# Patient Record
Sex: FEMALE | Race: WHITE | NOT HISPANIC OR LATINO | Employment: FULL TIME | URBAN - NONMETROPOLITAN AREA
[De-identification: names, ages, dates, MRNs, and addresses within clinical notes are randomized per-mention and may not be internally consistent; named-entity substitution may affect disease eponyms.]

---

## 2024-03-12 ENCOUNTER — HOSPITAL ENCOUNTER (EMERGENCY)
Facility: HOSPITAL | Age: 44
Discharge: HOME | End: 2024-03-13
Attending: STUDENT IN AN ORGANIZED HEALTH CARE EDUCATION/TRAINING PROGRAM

## 2024-03-12 DIAGNOSIS — M25.562 ACUTE PAIN OF LEFT KNEE: Primary | ICD-10-CM

## 2024-03-12 PROCEDURE — 99283 EMERGENCY DEPT VISIT LOW MDM: CPT

## 2024-03-12 PROCEDURE — 96372 THER/PROPH/DIAG INJ SC/IM: CPT

## 2024-03-12 ASSESSMENT — PAIN DESCRIPTION - LOCATION: LOCATION: KNEE

## 2024-03-12 ASSESSMENT — PAIN SCALES - GENERAL: PAINLEVEL_OUTOF10: 10 - WORST POSSIBLE PAIN

## 2024-03-12 ASSESSMENT — PAIN DESCRIPTION - FREQUENCY: FREQUENCY: CONSTANT/CONTINUOUS

## 2024-03-12 ASSESSMENT — PAIN DESCRIPTION - DESCRIPTORS: DESCRIPTORS: ACHING

## 2024-03-12 ASSESSMENT — PAIN DESCRIPTION - ORIENTATION: ORIENTATION: RIGHT;LEFT

## 2024-03-12 ASSESSMENT — PAIN - FUNCTIONAL ASSESSMENT: PAIN_FUNCTIONAL_ASSESSMENT: 0-10

## 2024-03-13 VITALS
TEMPERATURE: 97.7 F | HEART RATE: 100 BPM | HEIGHT: 62 IN | OXYGEN SATURATION: 98 % | WEIGHT: 135 LBS | RESPIRATION RATE: 15 BRPM | DIASTOLIC BLOOD PRESSURE: 91 MMHG | SYSTOLIC BLOOD PRESSURE: 126 MMHG | BODY MASS INDEX: 24.84 KG/M2

## 2024-03-13 PROCEDURE — 2500000004 HC RX 250 GENERAL PHARMACY W/ HCPCS (ALT 636 FOR OP/ED)

## 2024-03-13 RX ORDER — KETOROLAC TROMETHAMINE 15 MG/ML
15 INJECTION, SOLUTION INTRAMUSCULAR; INTRAVENOUS ONCE
Status: COMPLETED | OUTPATIENT
Start: 2024-03-13 | End: 2024-03-13

## 2024-03-13 RX ORDER — KETOROLAC TROMETHAMINE 15 MG/ML
INJECTION, SOLUTION INTRAMUSCULAR; INTRAVENOUS
Status: COMPLETED
Start: 2024-03-13 | End: 2024-03-13

## 2024-03-13 RX ADMIN — KETOROLAC TROMETHAMINE 15 MG: 15 INJECTION, SOLUTION INTRAMUSCULAR; INTRAVENOUS at 00:35

## 2024-03-13 RX ADMIN — KETOROLAC TROMETHAMINE 15 MG: 15 INJECTION INTRAMUSCULAR; INTRAVENOUS at 00:35

## 2024-03-13 ASSESSMENT — COLUMBIA-SUICIDE SEVERITY RATING SCALE - C-SSRS
6. HAVE YOU EVER DONE ANYTHING, STARTED TO DO ANYTHING, OR PREPARED TO DO ANYTHING TO END YOUR LIFE?: NO
2. HAVE YOU ACTUALLY HAD ANY THOUGHTS OF KILLING YOURSELF?: NO
1. IN THE PAST MONTH, HAVE YOU WISHED YOU WERE DEAD OR WISHED YOU COULD GO TO SLEEP AND NOT WAKE UP?: NO

## 2024-03-13 ASSESSMENT — PAIN SCALES - GENERAL: PAINLEVEL_OUTOF10: 5 - MODERATE PAIN

## 2024-03-13 ASSESSMENT — PAIN DESCRIPTION - LOCATION: LOCATION: KNEE

## 2024-03-13 ASSESSMENT — PAIN - FUNCTIONAL ASSESSMENT: PAIN_FUNCTIONAL_ASSESSMENT: 0-10

## 2024-03-13 NOTE — ED PROVIDER NOTES
Chief Complaint: Left knee pain and swelling  HPI: This is a 44-year-old female, presenting to the emergency department for left knee pain and swelling which began a few days ago.  Patient states that she has intermittently had joint pains for the last 20+ years.  She denies any new injury or trauma to the left knee.  She states that it is difficult to walk secondary to the pain.  Of note, the patient does admit to using methamphetamine tonight, and does have some pressured speech and tangential thinking, likely secondary to methamphetamine intoxication.  No other complaints at this time.    History reviewed. No pertinent past medical history.   Past Surgical History:   Procedure Laterality Date    US GUIDED ASPIRATION OF ABSCESS, HEMATOMA, CYST  12/15/2022    US GUIDED ASPIRATION OF ABSCESS, HEMATOMA, CYST 12/15/2022 [facility] [place] [facility]    US GUIDED TRANSVAGINAL TRANSRECTAL FLUID DRAIN  12/15/2022    US GUIDED TRANSVAGINAL TRANSRECTAL FLUID DRAIN 12/15/2022 [facility] [place] [facility]       Physical Exam  Constitutional:       Appearance: Normal appearance.   HENT:      Head: Normocephalic and atraumatic.      Mouth/Throat:      Mouth: Mucous membranes are moist.   Eyes:      Conjunctiva/sclera: Conjunctivae normal.   Cardiovascular:      Rate and Rhythm: Normal rate.   Pulmonary:      Effort: Pulmonary effort is normal.   Abdominal:      General: Abdomen is flat.      Palpations: Abdomen is soft.   Musculoskeletal:         General: Swelling (Very mild swelling to the left knee.) present. Normal range of motion.      Cervical back: Normal range of motion.      Comments: Mild swelling to the left knee, no overlying erythema, ecchymosis, warmth.  Neurovascularly intact distally with 1+ DP pulses bilaterally.   Skin:     General: Skin is warm and dry.      Capillary Refill: Capillary refill takes less than 2 seconds.      Comments: Chronic mottled rash to the lower extremities which patient states is unchanged for several  years.   Neurological:      General: No focal deficit present.      Mental Status: She is alert.   Psychiatric:         Mood and Affect: Mood normal.            ED Course/MDM  Diagnoses as of 03/13/24 0546   Acute pain of left knee       This is a 44 y.o. female presenting to the ED for left knee pain which began a few days ago and increased in severity today.  Patient states that she has had intermittent joint pain for her entire adult life.  She denies any new injury or trauma to her left knee.  Of note, patient does admit to using methamphetamine prior to arrival.  On physical exam, the patient is anxious appearing with pressured and occasionally tangential speech, however no SI or HI, no hallucinations, and easily redirectable.  She does have some mild tenderness and swelling to the left knee without any overlying erythema, ecchymosis.  Intact range of motion of the lower extremities bilaterally.  1+ DP pulses bilaterally.  There is chronic mottled rash to the lower extremities which patient states is unchanged for the last several years as she has a history of Raynaud's and vascular issues from the past.  I do not feel that any imaging is indicated at this time, as the patient denies any direct injury or trauma.  Patient was given information for follow-up with primary care provider as well as Ortho.  She was encouraged to return to the emergency department for any worsening symptoms and was ultimately discharged home in stable condition.    Final Impression  1.  Left knee pain  Disposition/Plan: Discharge home  Condition at disposition: Stable.     Tanisha Baeza DO  Emergency Medicine Physician     Tanisha Baeza DO  03/13/24 0550

## 2024-09-10 ENCOUNTER — HOSPITAL ENCOUNTER (EMERGENCY)
Facility: HOSPITAL | Age: 44
Discharge: OTHER INSTITUTION | End: 2024-09-10
Attending: EMERGENCY MEDICINE
Payer: MEDICAID

## 2024-09-10 ENCOUNTER — HOSPITAL ENCOUNTER (INPATIENT)
Facility: HOSPITAL | Age: 44
LOS: 2 days | Discharge: HOME | End: 2024-09-12
Attending: STUDENT IN AN ORGANIZED HEALTH CARE EDUCATION/TRAINING PROGRAM | Admitting: OBSTETRICS & GYNECOLOGY
Payer: MEDICAID

## 2024-09-10 ENCOUNTER — APPOINTMENT (OUTPATIENT)
Dept: RADIOLOGY | Facility: HOSPITAL | Age: 44
End: 2024-09-10
Payer: MEDICAID

## 2024-09-10 VITALS
WEIGHT: 135 LBS | SYSTOLIC BLOOD PRESSURE: 105 MMHG | DIASTOLIC BLOOD PRESSURE: 74 MMHG | OXYGEN SATURATION: 98 % | TEMPERATURE: 99.3 F | BODY MASS INDEX: 24.84 KG/M2 | HEART RATE: 72 BPM | HEIGHT: 62 IN | RESPIRATION RATE: 18 BRPM

## 2024-09-10 DIAGNOSIS — N70.11 HYDROSALPINX: Primary | ICD-10-CM

## 2024-09-10 DIAGNOSIS — N73.9 PELVIC INFLAMMATORY DISEASE: ICD-10-CM

## 2024-09-10 DIAGNOSIS — N70.93 TUBO-OVARIAN ABSCESS: Primary | ICD-10-CM

## 2024-09-10 LAB
ALBUMIN SERPL BCP-MCNC: 3.7 G/DL (ref 3.4–5)
ALP SERPL-CCNC: 88 U/L (ref 33–110)
ALT SERPL W P-5'-P-CCNC: 23 U/L (ref 7–45)
AMPHETAMINES UR QL SCN: ABNORMAL
ANION GAP SERPL CALC-SCNC: 12 MMOL/L (ref 10–20)
APPEARANCE UR: CLEAR
AST SERPL W P-5'-P-CCNC: 34 U/L (ref 9–39)
BARBITURATES UR QL SCN: ABNORMAL
BASOPHILS # BLD AUTO: 0.05 X10*3/UL (ref 0–0.1)
BASOPHILS NFR BLD AUTO: 0.4 %
BENZODIAZ UR QL SCN: ABNORMAL
BILIRUB SERPL-MCNC: 1.4 MG/DL (ref 0–1.2)
BILIRUB UR STRIP.AUTO-MCNC: NEGATIVE MG/DL
BUN SERPL-MCNC: 5 MG/DL (ref 6–23)
BZE UR QL SCN: ABNORMAL
CALCIUM SERPL-MCNC: 8.4 MG/DL (ref 8.6–10.3)
CANNABINOIDS UR QL SCN: ABNORMAL
CHLORIDE SERPL-SCNC: 102 MMOL/L (ref 98–107)
CLUE CELLS SPEC QL WET PREP: NORMAL
CO2 SERPL-SCNC: 26 MMOL/L (ref 21–32)
COLOR UR: YELLOW
CREAT SERPL-MCNC: 0.43 MG/DL (ref 0.5–1.05)
EGFRCR SERPLBLD CKD-EPI 2021: >90 ML/MIN/1.73M*2
EOSINOPHIL # BLD AUTO: 0.05 X10*3/UL (ref 0–0.7)
EOSINOPHIL NFR BLD AUTO: 0.4 %
ERYTHROCYTE [DISTWIDTH] IN BLOOD BY AUTOMATED COUNT: 14.8 % (ref 11.5–14.5)
FENTANYL+NORFENTANYL UR QL SCN: ABNORMAL
FLUAV RNA RESP QL NAA+PROBE: NOT DETECTED
FLUBV RNA RESP QL NAA+PROBE: NOT DETECTED
GLUCOSE SERPL-MCNC: 68 MG/DL (ref 74–99)
GLUCOSE UR STRIP.AUTO-MCNC: NORMAL MG/DL
HCG UR QL IA.RAPID: NEGATIVE
HCT VFR BLD AUTO: 38 % (ref 36–46)
HGB BLD-MCNC: 11.9 G/DL (ref 12–16)
IMM GRANULOCYTES # BLD AUTO: 0.07 X10*3/UL (ref 0–0.7)
IMM GRANULOCYTES NFR BLD AUTO: 0.5 % (ref 0–0.9)
KETONES UR STRIP.AUTO-MCNC: NEGATIVE MG/DL
LACTATE SERPL-SCNC: 0.8 MMOL/L (ref 0.4–2)
LEUKOCYTE ESTERASE UR QL STRIP.AUTO: ABNORMAL
LIPASE SERPL-CCNC: 11 U/L (ref 9–82)
LYMPHOCYTES # BLD AUTO: 1.53 X10*3/UL (ref 1.2–4.8)
LYMPHOCYTES NFR BLD AUTO: 11 %
MCH RBC QN AUTO: 25.2 PG (ref 26–34)
MCHC RBC AUTO-ENTMCNC: 31.3 G/DL (ref 32–36)
MCV RBC AUTO: 81 FL (ref 80–100)
METHADONE UR QL SCN: ABNORMAL
MONOCYTES # BLD AUTO: 1.21 X10*3/UL (ref 0.1–1)
MONOCYTES NFR BLD AUTO: 8.7 %
NEUTROPHILS # BLD AUTO: 10.98 X10*3/UL (ref 1.2–7.7)
NEUTROPHILS NFR BLD AUTO: 79 %
NITRITE UR QL STRIP.AUTO: NEGATIVE
NRBC BLD-RTO: 0 /100 WBCS (ref 0–0)
OPIATES UR QL SCN: ABNORMAL
OXYCODONE+OXYMORPHONE UR QL SCN: ABNORMAL
PCP UR QL SCN: ABNORMAL
PH UR STRIP.AUTO: 7 [PH]
PLATELET # BLD AUTO: 340 X10*3/UL (ref 150–450)
POTASSIUM SERPL-SCNC: 3.6 MMOL/L (ref 3.5–5.3)
POTASSIUM SERPL-SCNC: 5.5 MMOL/L (ref 3.5–5.3)
PROT SERPL-MCNC: 7.1 G/DL (ref 6.4–8.2)
PROT UR STRIP.AUTO-MCNC: ABNORMAL MG/DL
RBC # BLD AUTO: 4.72 X10*6/UL (ref 4–5.2)
RBC # UR STRIP.AUTO: ABNORMAL /UL
RBC #/AREA URNS AUTO: >20 /HPF
RSV RNA RESP QL NAA+PROBE: NOT DETECTED
SARS-COV-2 RNA RESP QL NAA+PROBE: NOT DETECTED
SODIUM SERPL-SCNC: 134 MMOL/L (ref 136–145)
SP GR UR STRIP.AUTO: >1.05
SQUAMOUS #/AREA URNS AUTO: ABNORMAL /HPF
T VAGINALIS SPEC QL WET PREP: NORMAL
TRICHOMONAS REFLEX COMMENT: NORMAL
UROBILINOGEN UR STRIP.AUTO-MCNC: NORMAL MG/DL
WBC # BLD AUTO: 13.9 X10*3/UL (ref 4.4–11.3)
WBC #/AREA URNS AUTO: >50 /HPF
WBC CLUMPS #/AREA URNS AUTO: ABNORMAL /HPF
WBC VAG QL WET PREP: NORMAL
YEAST VAG QL WET PREP: NORMAL

## 2024-09-10 PROCEDURE — 96367 TX/PROPH/DG ADDL SEQ IV INF: CPT | Performed by: EMERGENCY MEDICINE

## 2024-09-10 PROCEDURE — 87661 TRICHOMONAS VAGINALIS AMPLIF: CPT | Mod: GEALAB | Performed by: PHYSICIAN ASSISTANT

## 2024-09-10 PROCEDURE — 2500000004 HC RX 250 GENERAL PHARMACY W/ HCPCS (ALT 636 FOR OP/ED): Performed by: EMERGENCY MEDICINE

## 2024-09-10 PROCEDURE — 87086 URINE CULTURE/COLONY COUNT: CPT | Mod: GEALAB | Performed by: PHYSICIAN ASSISTANT

## 2024-09-10 PROCEDURE — 96365 THER/PROPH/DIAG IV INF INIT: CPT | Mod: 59 | Performed by: EMERGENCY MEDICINE

## 2024-09-10 PROCEDURE — 74177 CT ABD & PELVIS W/CONTRAST: CPT | Performed by: RADIOLOGY

## 2024-09-10 PROCEDURE — 80307 DRUG TEST PRSMV CHEM ANLYZR: CPT | Performed by: PHYSICIAN ASSISTANT

## 2024-09-10 PROCEDURE — 83690 ASSAY OF LIPASE: CPT | Performed by: PHYSICIAN ASSISTANT

## 2024-09-10 PROCEDURE — 87389 HIV-1 AG W/HIV-1&-2 AB AG IA: CPT | Mod: GEALAB | Performed by: PHYSICIAN ASSISTANT

## 2024-09-10 PROCEDURE — 87210 SMEAR WET MOUNT SALINE/INK: CPT | Performed by: PHYSICIAN ASSISTANT

## 2024-09-10 PROCEDURE — 76830 TRANSVAGINAL US NON-OB: CPT | Performed by: RADIOLOGY

## 2024-09-10 PROCEDURE — 36415 COLL VENOUS BLD VENIPUNCTURE: CPT | Performed by: PHYSICIAN ASSISTANT

## 2024-09-10 PROCEDURE — 1210000001 HC SEMI-PRIVATE ROOM DAILY

## 2024-09-10 PROCEDURE — 2500000005 HC RX 250 GENERAL PHARMACY W/O HCPCS: Performed by: PHYSICIAN ASSISTANT

## 2024-09-10 PROCEDURE — 74177 CT ABD & PELVIS W/CONTRAST: CPT

## 2024-09-10 PROCEDURE — 2500000001 HC RX 250 WO HCPCS SELF ADMINISTERED DRUGS (ALT 637 FOR MEDICARE OP)

## 2024-09-10 PROCEDURE — 2500000004 HC RX 250 GENERAL PHARMACY W/ HCPCS (ALT 636 FOR OP/ED): Performed by: PHYSICIAN ASSISTANT

## 2024-09-10 PROCEDURE — 87634 RSV DNA/RNA AMP PROBE: CPT | Performed by: PHYSICIAN ASSISTANT

## 2024-09-10 PROCEDURE — 80053 COMPREHEN METABOLIC PANEL: CPT | Performed by: PHYSICIAN ASSISTANT

## 2024-09-10 PROCEDURE — 96361 HYDRATE IV INFUSION ADD-ON: CPT | Performed by: EMERGENCY MEDICINE

## 2024-09-10 PROCEDURE — 87636 SARSCOV2 & INF A&B AMP PRB: CPT | Performed by: PHYSICIAN ASSISTANT

## 2024-09-10 PROCEDURE — 76856 US EXAM PELVIC COMPLETE: CPT | Performed by: RADIOLOGY

## 2024-09-10 PROCEDURE — 84132 ASSAY OF SERUM POTASSIUM: CPT | Performed by: PHYSICIAN ASSISTANT

## 2024-09-10 PROCEDURE — 2550000001 HC RX 255 CONTRASTS: Performed by: PHYSICIAN ASSISTANT

## 2024-09-10 PROCEDURE — 85025 COMPLETE CBC W/AUTO DIFF WBC: CPT | Performed by: PHYSICIAN ASSISTANT

## 2024-09-10 PROCEDURE — 81025 URINE PREGNANCY TEST: CPT | Performed by: PHYSICIAN ASSISTANT

## 2024-09-10 PROCEDURE — 83605 ASSAY OF LACTIC ACID: CPT | Performed by: PHYSICIAN ASSISTANT

## 2024-09-10 PROCEDURE — 76856 US EXAM PELVIC COMPLETE: CPT

## 2024-09-10 PROCEDURE — 87491 CHLMYD TRACH DNA AMP PROBE: CPT | Mod: GEALAB | Performed by: PHYSICIAN ASSISTANT

## 2024-09-10 PROCEDURE — 99285 EMERGENCY DEPT VISIT HI MDM: CPT | Mod: 25 | Performed by: EMERGENCY MEDICINE

## 2024-09-10 PROCEDURE — 81001 URINALYSIS AUTO W/SCOPE: CPT | Performed by: PHYSICIAN ASSISTANT

## 2024-09-10 RX ORDER — ACETAMINOPHEN 325 MG/1
975 TABLET ORAL EVERY 6 HOURS
Status: DISCONTINUED | OUTPATIENT
Start: 2024-09-10 | End: 2024-09-12 | Stop reason: HOSPADM

## 2024-09-10 RX ORDER — METRONIDAZOLE 500 MG/100ML
500 INJECTION, SOLUTION INTRAVENOUS EVERY 12 HOURS
Status: DISCONTINUED | OUTPATIENT
Start: 2024-09-11 | End: 2024-09-12 | Stop reason: HOSPADM

## 2024-09-10 RX ORDER — CEFTRIAXONE 1 G/50ML
1 INJECTION, SOLUTION INTRAVENOUS EVERY 24 HOURS
Status: DISCONTINUED | OUTPATIENT
Start: 2024-09-11 | End: 2024-09-12 | Stop reason: HOSPADM

## 2024-09-10 RX ORDER — ONDANSETRON 4 MG/1
4 TABLET, FILM COATED ORAL EVERY 6 HOURS PRN
Status: DISCONTINUED | OUTPATIENT
Start: 2024-09-10 | End: 2024-09-12 | Stop reason: HOSPADM

## 2024-09-10 RX ORDER — CEFTRIAXONE 2 G/50ML
2 INJECTION, SOLUTION INTRAVENOUS ONCE
Status: COMPLETED | OUTPATIENT
Start: 2024-09-10 | End: 2024-09-10

## 2024-09-10 RX ORDER — IBUPROFEN 600 MG/1
600 TABLET ORAL EVERY 6 HOURS
Status: DISCONTINUED | OUTPATIENT
Start: 2024-09-10 | End: 2024-09-12 | Stop reason: HOSPADM

## 2024-09-10 RX ORDER — METRONIDAZOLE 500 MG/100ML
500 INJECTION, SOLUTION INTRAVENOUS ONCE
Status: COMPLETED | OUTPATIENT
Start: 2024-09-10 | End: 2024-09-10

## 2024-09-10 RX ORDER — METRONIDAZOLE 500 MG/100ML
500 INJECTION, SOLUTION INTRAVENOUS EVERY 12 HOURS
Status: DISCONTINUED | OUTPATIENT
Start: 2024-09-10 | End: 2024-09-10

## 2024-09-10 RX ORDER — ONDANSETRON HYDROCHLORIDE 2 MG/ML
4 INJECTION, SOLUTION INTRAVENOUS EVERY 6 HOURS PRN
Status: DISCONTINUED | OUTPATIENT
Start: 2024-09-10 | End: 2024-09-12 | Stop reason: HOSPADM

## 2024-09-10 RX ORDER — IBUPROFEN 600 MG/1
600 TABLET ORAL EVERY 6 HOURS PRN
COMMUNITY
Start: 2022-12-16

## 2024-09-10 RX ORDER — ACETAMINOPHEN 500 MG
500 TABLET ORAL EVERY 6 HOURS PRN
COMMUNITY
Start: 2022-12-16

## 2024-09-10 RX ORDER — METOCLOPRAMIDE HYDROCHLORIDE 5 MG/ML
10 INJECTION INTRAMUSCULAR; INTRAVENOUS EVERY 6 HOURS PRN
Status: DISCONTINUED | OUTPATIENT
Start: 2024-09-10 | End: 2024-09-12 | Stop reason: HOSPADM

## 2024-09-10 RX ORDER — HYDROMORPHONE HYDROCHLORIDE 1 MG/ML
0.2 INJECTION, SOLUTION INTRAMUSCULAR; INTRAVENOUS; SUBCUTANEOUS EVERY 2 HOUR PRN
Status: DISCONTINUED | OUTPATIENT
Start: 2024-09-10 | End: 2024-09-12 | Stop reason: HOSPADM

## 2024-09-10 RX ORDER — CEFTRIAXONE 1 G/50ML
1 INJECTION, SOLUTION INTRAVENOUS EVERY 24 HOURS
Status: DISCONTINUED | OUTPATIENT
Start: 2024-09-10 | End: 2024-09-10

## 2024-09-10 RX ORDER — DEXTROSE 50 % IN WATER (D50W) INTRAVENOUS SYRINGE
12.5 ONCE
Status: COMPLETED | OUTPATIENT
Start: 2024-09-10 | End: 2024-09-10

## 2024-09-10 RX ORDER — TRAMADOL HYDROCHLORIDE 50 MG/1
100 TABLET ORAL EVERY 6 HOURS PRN
Status: DISCONTINUED | OUTPATIENT
Start: 2024-09-10 | End: 2024-09-12 | Stop reason: HOSPADM

## 2024-09-10 RX ORDER — TRAMADOL HYDROCHLORIDE 50 MG/1
50 TABLET ORAL EVERY 6 HOURS PRN
Status: DISCONTINUED | OUTPATIENT
Start: 2024-09-10 | End: 2024-09-12 | Stop reason: HOSPADM

## 2024-09-10 RX ORDER — DEXTROSE 50 % IN WATER (D50W) INTRAVENOUS SYRINGE
25 ONCE
Status: DISCONTINUED | OUTPATIENT
Start: 2024-09-10 | End: 2024-09-10

## 2024-09-10 RX ORDER — ACETAMINOPHEN 325 MG/1
650 TABLET ORAL ONCE
Status: COMPLETED | OUTPATIENT
Start: 2024-09-10 | End: 2024-09-10

## 2024-09-10 RX ORDER — METOCLOPRAMIDE 10 MG/1
10 TABLET ORAL EVERY 6 HOURS PRN
Status: DISCONTINUED | OUTPATIENT
Start: 2024-09-10 | End: 2024-09-12 | Stop reason: HOSPADM

## 2024-09-10 SDOH — SOCIAL STABILITY: SOCIAL INSECURITY: DOES ANYONE TRY TO KEEP YOU FROM HAVING/CONTACTING OTHER FRIENDS OR DOING THINGS OUTSIDE YOUR HOME?: NO

## 2024-09-10 SDOH — SOCIAL STABILITY: SOCIAL INSECURITY: WERE YOU ABLE TO COMPLETE ALL THE BEHAVIORAL HEALTH SCREENINGS?: YES

## 2024-09-10 SDOH — SOCIAL STABILITY: SOCIAL INSECURITY: ARE YOU OR HAVE YOU BEEN THREATENED OR ABUSED PHYSICALLY, EMOTIONALLY, OR SEXUALLY BY ANYONE?: NO

## 2024-09-10 SDOH — SOCIAL STABILITY: SOCIAL INSECURITY: DO YOU FEEL UNSAFE GOING BACK TO THE PLACE WHERE YOU ARE LIVING?: NO

## 2024-09-10 SDOH — SOCIAL STABILITY: SOCIAL INSECURITY: DO YOU FEEL ANYONE HAS EXPLOITED OR TAKEN ADVANTAGE OF YOU FINANCIALLY OR OF YOUR PERSONAL PROPERTY?: NO

## 2024-09-10 SDOH — SOCIAL STABILITY: SOCIAL INSECURITY: HAS ANYONE EVER THREATENED TO HURT YOUR FAMILY OR YOUR PETS?: NO

## 2024-09-10 SDOH — SOCIAL STABILITY: SOCIAL INSECURITY: ARE THERE ANY APPARENT SIGNS OF INJURIES/BEHAVIORS THAT COULD BE RELATED TO ABUSE/NEGLECT?: NO

## 2024-09-10 SDOH — SOCIAL STABILITY: SOCIAL INSECURITY: ABUSE: ADULT

## 2024-09-10 SDOH — SOCIAL STABILITY: SOCIAL INSECURITY: HAVE YOU HAD ANY THOUGHTS OF HARMING ANYONE ELSE?: NO

## 2024-09-10 SDOH — SOCIAL STABILITY: SOCIAL INSECURITY: HAVE YOU HAD THOUGHTS OF HARMING ANYONE ELSE?: NO

## 2024-09-10 ASSESSMENT — COLUMBIA-SUICIDE SEVERITY RATING SCALE - C-SSRS

## 2024-09-10 ASSESSMENT — ACTIVITIES OF DAILY LIVING (ADL)
TOILETING: INDEPENDENT
ADEQUATE_TO_COMPLETE_ADL: YES
LACK_OF_TRANSPORTATION: NO
DRESSING YOURSELF: INDEPENDENT
PATIENT'S MEMORY ADEQUATE TO SAFELY COMPLETE DAILY ACTIVITIES?: YES
LACK_OF_TRANSPORTATION: NO
HEARING - LEFT EAR: FUNCTIONAL
WALKS IN HOME: INDEPENDENT
BATHING: INDEPENDENT
HEARING - RIGHT EAR: FUNCTIONAL
FEEDING YOURSELF: INDEPENDENT
GROOMING: INDEPENDENT
JUDGMENT_ADEQUATE_SAFELY_COMPLETE_DAILY_ACTIVITIES: YES

## 2024-09-10 ASSESSMENT — LIFESTYLE VARIABLES
HOW OFTEN DO YOU HAVE 6 OR MORE DRINKS ON ONE OCCASION: NEVER
AUDIT-C TOTAL SCORE: 0
AUDIT-C TOTAL SCORE: 0
HOW OFTEN DO YOU HAVE A DRINK CONTAINING ALCOHOL: NEVER
HOW MANY STANDARD DRINKS CONTAINING ALCOHOL DO YOU HAVE ON A TYPICAL DAY: PATIENT DOES NOT DRINK
SKIP TO QUESTIONS 9-10: 1

## 2024-09-10 ASSESSMENT — COGNITIVE AND FUNCTIONAL STATUS - GENERAL
DAILY ACTIVITIY SCORE: 24
MOBILITY SCORE: 24
PATIENT BASELINE BEDBOUND: NO

## 2024-09-10 ASSESSMENT — PAIN - FUNCTIONAL ASSESSMENT: PAIN_FUNCTIONAL_ASSESSMENT: 0-10

## 2024-09-10 ASSESSMENT — PAIN DESCRIPTION - DESCRIPTORS: DESCRIPTORS: SHARP;TENDER

## 2024-09-10 ASSESSMENT — PAIN SCALES - GENERAL
PAINLEVEL_OUTOF10: 6
PAINLEVEL_OUTOF10: 4

## 2024-09-10 ASSESSMENT — PAIN DESCRIPTION - PAIN TYPE: TYPE: ACUTE PAIN

## 2024-09-10 ASSESSMENT — PATIENT HEALTH QUESTIONNAIRE - PHQ9
2. FEELING DOWN, DEPRESSED OR HOPELESS: NOT AT ALL
SUM OF ALL RESPONSES TO PHQ9 QUESTIONS 1 & 2: 0
1. LITTLE INTEREST OR PLEASURE IN DOING THINGS: NOT AT ALL

## 2024-09-10 ASSESSMENT — PAIN DESCRIPTION - LOCATION: LOCATION: ABDOMEN

## 2024-09-10 NOTE — PROGRESS NOTES
This patient was seen by the advanced practice provider.  I have personally performed a substantive portion of the encounter.  I have seen and examined the patient; agree with the workup, evaluation, MDM, management and diagnosis.  The care plan has been discussed.      I personally saw the patient and made/approved the management plan and take responsibility for the patient management.    History: This is a 44-year-old female came to the emergency department for various complaints.  She does complain of abdominal pain post mostly lower compared to upper.  Some nausea without vomiting.  Exam: Abdomen is soft with somewhat diffuse tenderness.  I did perform the pelvic exam.  External genitalia is well estrogenized.  There is grayish-yellow discharge at the introitus.  There is yellowish-gray discharge in the vaginal vault with scant blood.  Cultures were obtained.  Patient did have difficulty tolerating the pelvic exam.  She did have pain with cervical motion movement and the bimanual exam.  MDM: This is a 44-year-old female comes in with abdominal pain.  CT scan of the abdomen was obtained and there was concern for gynecologic pathology.  Pelvic ultrasound was obtained.  Please see the chart for details but there is concern for tubo-ovarian abscess.  The case was discussed with the gynecologist on-call who recommended transfer to a higher level of care given the complexity of the CT scan and this case in general.  This was discussed with the patient and she was agreeable.  Antibiotics were administered.    US PELVIS TRANSABDOMINAL WITH TRANSVAGINAL   Final Result   1. Bilateral complex cystic lesions of the ovaries measuring up to   6.6 x 5.7 x 3.7 cm on the right and 6.0 x 4.9 x 4.1 cm on the left.   Differential diagnosis is unchanged since comparison CT scan   performed on the same date and includes sequela of pelvic   inflammatory disease/tubo-ovarian abscesses given prior reported   history from examinations in  2022, bilateral hemorrhagic ovarian   cysts,, among other considerations. Clinical correlation is advised   as well as previously recommended interval sonographic follow-up to   ensure resolution.        MACRO:   None        Signed by: Rito Yeung 9/10/2024 1:42 PM   Dictation workstation:   [id number]      CT abdomen pelvis w IV contrast   Final Result   Dilated fluid-filled slightly thick-walled small bowel loops.   Differential includes nonspecific enteritis which could have   infectious, inflammatory or ischemic etiology.        Probable bilateral adnexal cystic lesions, less likely distended   fluid-filled bowel loops. Differential includes physiologic ovarian   cysts and PID/TOA. Correlation with clinical findings recommended.   Further prompt evaluation with pelvic ultrasound or MRI as clinically   warranted. At minimum, follow-up ultrasound in 2-3 menstrual cycles   to ensure resolution recommended given size of the suspected left   adnexal cystic lesion.        Trace free fluid in the pelvis.        Small fat containing umbilical hernia.        MACRO:   None.        Signed by: Olesya Clark 9/10/2024 12:00 PM   Dictation workstation:   [id number]

## 2024-09-10 NOTE — ED PROVIDER NOTES
HPI   Chief Complaint   Patient presents with    Flu Symptoms     Fever last night, abdominal pain and body aches, pt is concerned about TSS due to increase of tampon use        History of present illness:  44-year-old female presents emergency room for complaints of abdominal pain.  The patient states that last night she began having diffuse abdominal pain particularly in her left and right lower side.  She states she has not had any vomiting but did have some nausea.  She states that she fell like she had a fever last night but did not take her temperature.  She states that she has not had any chest pain or shortness of breath.  She states that she was able to eat last night last and has not eaten anything today.  She states that she did has had her gallbladder taken out in the past but still has her appendix.  She states that she has no other symptoms at this time.    Social history: Negative for alcohol and drug use.    Review of systems:   Gen.: No weight loss,  fever.   Eyes: No vision loss, double vision  ENT: No pharyngitis, neck pain, headache  Cardiac: No chest pain, palpitations, syncope, near syncope.   Pulmonary: No shortness of breath, cough, hemoptysis.   Heme/lymph: No swollen glands, fever, bleeding.   GI: No  change in bowel habits, melena, hematemesis, hematochezia, vomiting, diarrhea.   : No discharge, dysuria, frequency, urgency, hematuria.   Musculoskeletal: No limb pain, joint pain, joint swelling.   Skin: No rashes.   Review of systems is otherwise negative unless stated above or in history of present illness.        Physical exam:  General: Vitals noted, Afebrile.   EENT: No lymphadenopathy appreciated  Cardiac: Regular, rate, rhythm, no murmur.   Pulmonary: Lungs clear bilaterally with good aeration. No adventitious breath sounds.   Abdomen:  No peritoneal signs. Normoactive bowel sounds.  Patient has pain to palpation in the right lower and left lower abdomen over the suprapubic area as  Kosta Plascencia is calling to request a refill on the following medication(s):    Last Visit Date (If Applicable):  77/05/8874    Next Visit Date:    2/4/2022    Medication Request:  Requested Prescriptions     Pending Prescriptions Disp Refills    benztropine (COGENTIN) 0.5 MG tablet [Pharmacy Med Name: BENZTROPINE MES 0.5 MG TAB 0.5 Tablet] 90 tablet 0     Sig: TAKE 1/2-1 TABLET BY MOUTH EVERY MORNING AND 1 OR 2 TABLETS EVERY EVENING well, pain is worse on the right compared to the left  Extremities: No peripheral edema.    Skin: No rash.   Neuro: No focal neurologic deficits,      Medical decision making:   Testing: CT scan abdomen pelvis with contrast that showed trace free fluid but also large cystic masses on the ovaries bilaterally concerning for possible hemorrhagic cyst versus tubo-ovarian abscesses, CBC showed leukocytosis at 13.7, CMP lactate unremarkable urinalysis unremarkable pregnancy test negative.  All imaging interpreted by radiology, ultrasound the pelvis was performed which showed similar results to the CT scan.    Reevaluation: I spoke with the patient mother results at this time discussed with her her history of having tubo-ovarian abscesses.  She explained to me that upon further questioning they do feel very similar to they did the last time when she had them couple of years ago.  She confirms that she is currently sexually active with 1 male partner and they have not been using a protective barriers.  She also notes that over the past week she has been having vaginal discharge and large amounts and she has been concerned about this.  She confirms as well that she has also been still using methamphetamines on a regular basis usually daily but she states the last use was about 3 days ago.  She denies any other symptoms at this time.  Pelvic exam was performed by the attending physician at this time and she noted yellow to gray discharge and large copious amounts as well as cervical motion tenderness and pain to palpation over the adnexa bilaterally on exam.  I spoke with Dr. Eron singletary of gynecology at this facility who reviewed the case remotely and explained to me that given the complexity of the case she felt the patient be transferred to [facility] for further care and treatment due to limited resources here.  I put in a transfer request to the transfer center at this time.  The patient was also started on IV Rocephin  doxycycline and Flagyl at this time for appropriate coverage of pelvic inflammatory disease.  Plan: The patient will be transferred to [facility] for further care and treatment.  The patient will be transferred to [facility] under the care of Dr Henao.  I explained the results to the patient this time and she is agreeable this plan as well.  Impression:   1.  Tubo-ovarian abscess            History provided by:  Patient   used: No            Patient History   History reviewed. No pertinent past medical history.  Past Surgical History:   Procedure Laterality Date    US GUIDED ASPIRATION OF ABSCESS, HEMATOMA, CYST  12/15/2022    US GUIDED ASPIRATION OF ABSCESS, HEMATOMA, CYST 12/15/2022 [facility]    US GUIDED TRANSVAGINAL TRANSRECTAL FLUID DRAIN  12/15/2022    US GUIDED TRANSVAGINAL TRANSRECTAL FLUID DRAIN 12/15/2022 [facility]     No family history on file.  Social History     Tobacco Use    Smoking status: Unknown    Smokeless tobacco: Not on file   Substance Use Topics    Alcohol use: Not on file    Drug use: Yes     Types: Methamphetamines       Physical Exam   ED Triage Vitals [09/10/24 0932]   Temperature Heart Rate Respirations BP   37 °C (98.6 °F) 93 18 (!) 110/94      Pulse Ox Temp src Heart Rate Source Patient Position   98 % -- -- --      BP Location FiO2 (%)     -- --       Physical Exam      ED Course & MDM   Diagnoses as of 09/10/24 1602   Tubo-ovarian abscess                 No data recorded     [place] Coma Scale Score: 15 (09/10/24 0933 : Clau Valladares RN)                           Medical Decision Making      Procedure  Procedures     Tam Peterson PA-C  09/10/24 3246

## 2024-09-10 NOTE — PROGRESS NOTES
Pharmacy Medication History Review    Sara Neal is a 44 y.o. female admitted for No Principal Problem: There is no principal problem currently on the Problem List. Please update the Problem List and refresh.. Pharmacy reviewed the patient's [id number] medications and allergies for accuracy.    The list below reflectives the updated PTA list. Please review each medication in order reconciliation for additional clarification and justification.    Prior to Admission Medications   Prescriptions Last Dose Informant Patient Reported? Taking?   acetaminophen (Tylenol) 500 mg tablet 9/9/2024  Yes Yes   Sig: Take 1 tablet (500 mg) by mouth every 6 hours if needed for mild pain (1 - 3).   ibuprofen (IBU) 600 mg tablet 9/9/2024  Yes Yes   Sig: Take 1 tablet (600 mg) by mouth every 6 hours if needed for headaches or mild pain (1 - 3).      Facility-Administered Medications: None         The list below reflectives the updated allergy list. Please review each documented allergy for additional clarification and justification.  Allergies  Reviewed by Rayshawn Huerta, PharmD on 9/10/2024   No Known Allergies       Rayshawn Huerta, PharmD  PGY1 Pharmacy Resident

## 2024-09-11 PROBLEM — N73.9 PELVIC INFLAMMATORY DISEASE: Status: ACTIVE | Noted: 2024-09-11

## 2024-09-11 LAB
ABO GROUP (TYPE) IN BLOOD: NORMAL
ALBUMIN SERPL BCP-MCNC: 3.2 G/DL (ref 3.4–5)
ALP SERPL-CCNC: 108 U/L (ref 33–110)
ALT SERPL W P-5'-P-CCNC: 18 U/L (ref 7–45)
ANION GAP SERPL CALC-SCNC: 12 MMOL/L (ref 10–20)
ANTIBODY SCREEN: NORMAL
APTT PPP: 29 SECONDS (ref 27–38)
AST SERPL W P-5'-P-CCNC: 20 U/L (ref 9–39)
BASOPHILS # BLD AUTO: 0.05 X10*3/UL (ref 0–0.1)
BASOPHILS NFR BLD AUTO: 0.3 %
BILIRUB SERPL-MCNC: 1.1 MG/DL (ref 0–1.2)
BUN SERPL-MCNC: 6 MG/DL (ref 6–23)
C TRACH RRNA SPEC QL NAA+PROBE: NEGATIVE
CALCIUM SERPL-MCNC: 8.5 MG/DL (ref 8.6–10.6)
CHLORIDE SERPL-SCNC: 106 MMOL/L (ref 98–107)
CO2 SERPL-SCNC: 22 MMOL/L (ref 21–32)
CREAT SERPL-MCNC: 0.36 MG/DL (ref 0.5–1.05)
EGFRCR SERPLBLD CKD-EPI 2021: >90 ML/MIN/1.73M*2
EOSINOPHIL # BLD AUTO: 0.14 X10*3/UL (ref 0–0.7)
EOSINOPHIL NFR BLD AUTO: 0.9 %
ERYTHROCYTE [DISTWIDTH] IN BLOOD BY AUTOMATED COUNT: 14.4 % (ref 11.5–14.5)
GLUCOSE SERPL-MCNC: 66 MG/DL (ref 74–99)
HCT VFR BLD AUTO: 35.2 % (ref 36–46)
HGB BLD-MCNC: 10.5 G/DL (ref 12–16)
HIV 1+2 AB+HIV1 P24 AG SERPL QL IA: NONREACTIVE
HOLD SPECIMEN: NORMAL
IMM GRANULOCYTES # BLD AUTO: 0.07 X10*3/UL (ref 0–0.7)
IMM GRANULOCYTES NFR BLD AUTO: 0.5 % (ref 0–0.9)
INR PPP: 1.1 (ref 0.9–1.1)
LYMPHOCYTES # BLD AUTO: 1.72 X10*3/UL (ref 1.2–4.8)
LYMPHOCYTES NFR BLD AUTO: 11.5 %
MCH RBC QN AUTO: 24.8 PG (ref 26–34)
MCHC RBC AUTO-ENTMCNC: 29.8 G/DL (ref 32–36)
MCV RBC AUTO: 83 FL (ref 80–100)
MONOCYTES # BLD AUTO: 1.35 X10*3/UL (ref 0.1–1)
MONOCYTES NFR BLD AUTO: 9 %
N GONORRHOEA DNA SPEC QL PROBE+SIG AMP: POSITIVE
NEUTROPHILS # BLD AUTO: 11.6 X10*3/UL (ref 1.2–7.7)
NEUTROPHILS NFR BLD AUTO: 77.8 %
NRBC BLD-RTO: 0 /100 WBCS (ref 0–0)
PLATELET # BLD AUTO: 330 X10*3/UL (ref 150–450)
POTASSIUM SERPL-SCNC: 4.1 MMOL/L (ref 3.5–5.3)
PROT SERPL-MCNC: 6.2 G/DL (ref 6.4–8.2)
PROTHROMBIN TIME: 12.7 SECONDS (ref 9.8–12.8)
RBC # BLD AUTO: 4.23 X10*6/UL (ref 4–5.2)
RH FACTOR (ANTIGEN D): NORMAL
SODIUM SERPL-SCNC: 136 MMOL/L (ref 136–145)
T VAGINALIS RRNA SPEC QL NAA+PROBE: POSITIVE
WBC # BLD AUTO: 14.9 X10*3/UL (ref 4.4–11.3)

## 2024-09-11 PROCEDURE — 86870 RBC ANTIBODY IDENTIFICATION: CPT

## 2024-09-11 PROCEDURE — 36415 COLL VENOUS BLD VENIPUNCTURE: CPT

## 2024-09-11 PROCEDURE — 86901 BLOOD TYPING SEROLOGIC RH(D): CPT

## 2024-09-11 PROCEDURE — 2500000004 HC RX 250 GENERAL PHARMACY W/ HCPCS (ALT 636 FOR OP/ED)

## 2024-09-11 PROCEDURE — 1210000001 HC SEMI-PRIVATE ROOM DAILY

## 2024-09-11 PROCEDURE — 2500000001 HC RX 250 WO HCPCS SELF ADMINISTERED DRUGS (ALT 637 FOR MEDICARE OP): Performed by: STUDENT IN AN ORGANIZED HEALTH CARE EDUCATION/TRAINING PROGRAM

## 2024-09-11 PROCEDURE — 85610 PROTHROMBIN TIME: CPT

## 2024-09-11 PROCEDURE — 84075 ASSAY ALKALINE PHOSPHATASE: CPT | Performed by: OBSTETRICS & GYNECOLOGY

## 2024-09-11 PROCEDURE — 2500000001 HC RX 250 WO HCPCS SELF ADMINISTERED DRUGS (ALT 637 FOR MEDICARE OP)

## 2024-09-11 PROCEDURE — 85025 COMPLETE CBC W/AUTO DIFF WBC: CPT

## 2024-09-11 RX ORDER — CALCIUM CARBONATE 200(500)MG
500 TABLET,CHEWABLE ORAL 4 TIMES DAILY PRN
Status: DISCONTINUED | OUTPATIENT
Start: 2024-09-11 | End: 2024-09-12 | Stop reason: HOSPADM

## 2024-09-11 RX ORDER — SODIUM CHLORIDE, SODIUM LACTATE, POTASSIUM CHLORIDE, CALCIUM CHLORIDE 600; 310; 30; 20 MG/100ML; MG/100ML; MG/100ML; MG/100ML
75 INJECTION, SOLUTION INTRAVENOUS CONTINUOUS
Status: DISCONTINUED | OUTPATIENT
Start: 2024-09-11 | End: 2024-09-12 | Stop reason: HOSPADM

## 2024-09-11 RX ORDER — ACETAMINOPHEN 500 MG
5 TABLET ORAL NIGHTLY PRN
Status: DISCONTINUED | OUTPATIENT
Start: 2024-09-11 | End: 2024-09-12 | Stop reason: HOSPADM

## 2024-09-11 ASSESSMENT — PAIN DESCRIPTION - DESCRIPTORS: DESCRIPTORS: SHARP;TENDER

## 2024-09-11 ASSESSMENT — PAIN SCALES - GENERAL
PAINLEVEL_OUTOF10: 0 - NO PAIN
PAINLEVEL_OUTOF10: 3
PAINLEVEL_OUTOF10: 0 - NO PAIN
PAINLEVEL_OUTOF10: 0 - NO PAIN

## 2024-09-11 NOTE — H&P
History Of Present Illness  Sara Neal is a 44 y.o.  presenting as transfer from [facility] in setting of 2 day history of severe bilateral abdominal pain concerning for PID. Patient reports that she started experiencing bilateral abdominal pain two days prior that gt progressively worse. Reports feeling hot ans sweaty on the evening of , therefore she took he temperature which was found to be 100.7 degrees which promoted her to present to the ED. Upon presentation, found to be afebrile. Lactate and lipase wnl. CBC notable for leukocytosis w/o left shift. Respiratory viral panel all negative. UA notable for leuk esterases,+1 blood. TVUS showing bilateral cystic lesions in adnexa. STI screening pending.     On presentation to [facility], patient denies N/V, constipation, diarrhea, dysuria and hematuria. Rating abdominal pain 6/10. States that pain was improved with PO Tylenol. LMP recently ended one day prior but think she may still be having some spotting. Also thinks she may be having an abnml discharge that she aso noticed two days prior. Reports being sexually active with one male partner w/o barrier contraception. Denies h/o STIs.     Of note, patient was admitted in 2022 for PID with bilateral TOAs for IV antibiotics and IR drainage. Fluid culture from drainage found to be growing H. Influenzae, therefore she was discharged home with augmentin in addition to doxycycline and flagyl.     Past Medical History  Migraines   Substance use disorder   Tobacco use disorder     Surgical History  Cholecystectomy     OB History  Term vaginal delivery x2     Social History  She reports that she has been smoking cigarettes. She started smoking about 26 years ago. She has a 13.3 pack-year smoking history. She has never used smokeless tobacco. She reports current drug use. Drug: Methamphetamines. No history on file for alcohol use.    Allergies  Patient has no known allergies.    Review of Systems   All other  "systems reviewed and are negative.       Physical Exam  Constitutional:       Comments: Anxious    Cardiovascular:      Rate and Rhythm: Normal rate.   Pulmonary:      Effort: Pulmonary effort is normal.   Abdominal:      General: Abdomen is flat. There is no distension.      Palpations: Abdomen is soft.      Tenderness: There is no guarding or rebound.      Comments: Tenderness to palpation in BLQs    Genitourinary:     General: Normal vulva.      Comments: Purulent discharge coming from cervical os, significant CMT, no adnexal masses appreciated, uterus with regular contour   Musculoskeletal:         General: Normal range of motion.   Skin:     General: Skin is warm.   Neurological:      Mental Status: She is alert.   Psychiatric:      Comments: Anxious mood          Last Recorded Vitals  Blood pressure 97/57, pulse 99, temperature 37 °C (98.6 °F), temperature source Temporal, resp. rate 16, height 1.575 m (5' 2.01\"), weight 61 kg (134 lb 7.7 oz), last menstrual period 09/08/2024, SpO2 96%.    Relevant Results  Medications    Current Facility-Administered Medications:     acetaminophen (Tylenol) tablet 975 mg, 975 mg, oral, q6h, Sushant Guerrero MD, 975 mg at 09/10/24 2239    cefTRIAXone (Rocephin) 1 g in dextrose (iso) IV 50 mL, 1 g, intravenous, q24h, Sushant Guerrero MD    doxycycline (Vibramycin) 100 mg in dextrose 5% 100 mL IV, 100 mg, intravenous, q12h, Sushant Guerrero MD    HYDROmorphone (Dilaudid) injection 0.2 mg, 0.2 mg, intravenous, q2h PRN, Sushant Guerrero MD    ibuprofen tablet 600 mg, 600 mg, oral, q6h, Sushant Guerrero MD, 600 mg at 09/10/24 2239    lactated Ringer's infusion, 75 mL/hr, intravenous, Continuous, Sushant Guerrero MD, Last Rate: 75 mL/hr at 09/11/24 0019, 75 mL/hr at 09/11/24 0019    metoclopramide (Reglan) tablet 10 mg, 10 mg, oral, q6h PRN **OR** metoclopramide (Reglan) injection 10 mg, 10 mg, intravenous, q6h PRN, Sushant Guerrero MD    metroNIDAZOLE (Flagyl) 500 mg in sodium chloride " (iso)  mL, 500 mg, intravenous, q12h, Sushant Guerrero MD    ondansetron (Zofran) tablet 4 mg, 4 mg, oral, q6h PRN **OR** ondansetron (Zofran) injection 4 mg, 4 mg, intravenous, q6h PRN, Sushant Guerrero MD    oxygen (O2) therapy, , inhalation, Continuous PRN - O2/gases, Sushant Guerrero MD    traMADol (Ultram) tablet 100 mg, 100 mg, oral, q6h PRN, Sushant Guerrreo MD    traMADol (Ultram) tablet 50 mg, 50 mg, oral, q6h PRN, Sushant Guerrero MD    Labs  CBC  WBC   Date Value Ref Range Status   09/10/2024 13.9 (H) 4.4 - 11.3 x10*3/uL Final     nRBC   Date Value Ref Range Status   09/10/2024 0.0 0.0 - 0.0 /100 WBCs Final     RBC   Date Value Ref Range Status   09/10/2024 4.72 4.00 - 5.20 x10*6/uL Final     Hemoglobin   Date Value Ref Range Status   09/10/2024 11.9 (L) 12.0 - 16.0 g/dL Final     Hematocrit   Date Value Ref Range Status   09/10/2024 38.0 36.0 - 46.0 % Final     MCV   Date Value Ref Range Status   09/10/2024 81 80 - 100 fL Final     MCH   Date Value Ref Range Status   09/10/2024 25.2 (L) 26.0 - 34.0 pg Final     MCHC   Date Value Ref Range Status   09/10/2024 31.3 (L) 32.0 - 36.0 g/dL Final     RDW   Date Value Ref Range Status   09/10/2024 14.8 (H) 11.5 - 14.5 % Final     Platelets   Date Value Ref Range Status   09/10/2024 340 150 - 450 x10*3/uL Final       CMP  Glucose   Date Value Ref Range Status   09/10/2024 68 (L) 74 - 99 mg/dL Final     Sodium   Date Value Ref Range Status   09/10/2024 134 (L) 136 - 145 mmol/L Final     Potassium   Date Value Ref Range Status   09/10/2024 3.6 3.5 - 5.3 mmol/L Final     Comment:     MILD HEMOLYSIS DETECTED. The result may be falsely elevated due to hemolysis or other interferents. Clinical correlation is recommended. Repeat testing may be considered.     Chloride   Date Value Ref Range Status   09/10/2024 102 98 - 107 mmol/L Final     Bicarbonate   Date Value Ref Range Status   09/10/2024 26 21 - 32 mmol/L Final     Anion Gap   Date Value Ref Range Status  "  09/10/2024 12 10 - 20 mmol/L Final     Urea Nitrogen   Date Value Ref Range Status   09/10/2024 5 (L) 6 - 23 mg/dL Final     Creatinine   Date Value Ref Range Status   09/10/2024 0.43 (L) 0.50 - 1.05 mg/dL Final     eGFR   Date Value Ref Range Status   09/10/2024 >90 >60 mL/min/1.73m*2 Final     Comment:     Calculations of estimated GFR are performed using the 2021 CKD-EPI Study Refit equation without the race variable for the IDMS-Traceable creatinine methods.  https://jasn.asnjournals.org/content/early/2021/09/22/ASN.2189503803     Calcium   Date Value Ref Range Status   09/10/2024 8.4 (L) 8.6 - 10.3 mg/dL Final     Albumin   Date Value Ref Range Status   09/10/2024 3.7 3.4 - 5.0 g/dL Final     Alkaline Phosphatase   Date Value Ref Range Status   09/10/2024 88 33 - 110 U/L Final     Total Protein   Date Value Ref Range Status   09/10/2024 7.1 6.4 - 8.2 g/dL Final     AST   Date Value Ref Range Status   09/10/2024 34 9 - 39 U/L Final     Comment:     MODERATE HEMOLYSIS DETECTED. The result may be falsely elevated due to hemolysis or other interferents. Clinical correlation is recommended. Repeat testing may be considered.     Bilirubin, Total   Date Value Ref Range Status   09/10/2024 1.4 (H) 0.0 - 1.2 mg/dL Final     ALT   Date Value Ref Range Status   09/10/2024 23 7 - 45 U/L Final     Comment:     Patients treated with Sulfasalazine may generate falsely decreased results for ALT.       Coagulation   No results found for: \"PROTIME\", \"INR\", \"APTT\", \"FIBRINOGEN\"    Pregnancy Tests  HCG, Urine   Date Value Ref Range Status   09/10/2024 NEGATIVE NEGATIVE Final       Imaging   Results for orders placed during the hospital encounter of 09/10/24    US PELVIS TRANSABDOMINAL WITH TRANSVAGINAL    Narrative  Interpreted By:  Rito Yeung,  STUDY:  US PELVIS TRANSABDOMINAL WITH TRANSVAGINAL;  9/10/2024 1:12 pm    INDICATION:  Signs/Symptoms:bilateral lower abdominal cramping and pain, trace  free " fluid.    COMPARISON:  CT abdomen pelvis 09/10/2024, pelvic ultrasound 2022 and CT  abdomen pelvis 2022    ACCESSION NUMBER(S):  [id number]    ORDERING CLINICIAN:  FRANCISCO RICHARDS    TECHNIQUE:  Multiple multiplanar static gray scale, color and spectral waveform  sonographic images of the pelvis were obtained. Transabdominal and  endovaginal ultrasound was performed.    FINDINGS:  UTERUS:  The uterus measures  5.8 x 5.0 x 8.3. No myometrial lesions are  detected.    ENDOMETRIUM:  The endometrium measures a thickness of 1.1 cm, which is normal.    RIGHT ADNEXA:  The right ovary measures 4.3 x 3.7 x 5.8 and demonstrates normal  flow. There is a complex right ovarian cystic lesion measuring up to  5.7 x 3.7 x 6.6 cm.    LEFT ADNEXA:  Left ovary is not well visualized. Within the left adnexa, there is a  complex cystic lesion measuring up to 4.9 x 4.1 x 6.0 cm which is  presumably ovarian in origin.    CUL DE SAC:  Trace free fluid.    Impression  1. Bilateral complex cystic lesions of the ovaries measuring up to  6.6 x 5.7 x 3.7 cm on the right and 6.0 x 4.9 x 4.1 cm on the left.  Differential diagnosis is unchanged since comparison CT scan  performed on the same date and includes sequela of pelvic  inflammatory disease/tubo-ovarian abscesses given prior reported  history from examinations in , bilateral hemorrhagic ovarian  cysts,, among other considerations. Clinical correlation is advised  as well as previously recommended interval sonographic follow-up to  ensure resolution.    MACRO:  None    Signed by: Rito Yeung 9/10/2024 1:42 PM  Dictation workstation:   [id number]    Assessment/Plan   Assessment & Plan  Pelvic inflammatory disease  44 y.o.  presenting as transfer from [facility] in setting of 2 day history of severe bilateral abdominal pain concerning for PID.     PID with c/f TOAs  - Significant abdominal pain in BLQs in setting of purulent cervical discharge, cervical motion  tenderness and TVUS remarkable for bilateral complex cystic lesions of the ovaries measuring up to 6.6 x 5.7 x 3.7 cm on the right and 6.0 x 4.9 x 4.1 cm on the left thefore concerning for PID with TOAs vs bilateral hydrosalpinges   - Lactate wnl, hemodynamically normal, febrile x1 on presentation however low concern for sepsis at this time  - IV Ceftriaxone, Flagyl and Doxy started at OSH. Continued on presentation   - Pain management: scheduled tylenol, motrin; prn tramadol, dilaudid   - Repeat CBC w/ diff in the morning   - STI screening collected at OSH, pending   - NPO at midnight in anticipation for IR drainage in the event that pain not improved in the morning    Substance use   - Reporting methamphetamine use, last use 3 days prior, denies other substance use  - Utox positive for methamphetamines at [facility] ED  - Vitals wnl on admission, low suspicion for active intoxication  - Will monitor for si/sxs of withdrawal and offer supportive care  - Social work consult ordered     Dispo: pending clinical improvement     Seen and d/w Dr. Daniel Guerrero MD, PGY-3  Gynecology, pager [phone]

## 2024-09-11 NOTE — PROGRESS NOTES
SW consult placed due to MAKENNA history    SW met with patient at bedside to introduce self, and SW role.  Ms. Neal was resting, but agreeable to a brief interaction.  She reports living in stable housing at [address] [place] [address].  Current phone number is [phone].  Patient endorses history of IV Drug abuse, methamphetamines.  UDS on 9/10/24, positive for amphetamines.  Ms. Neal declined treatment resources offered by SW on this date.  She denies having any other unmet needs at this time.  SW will continue to follow, and assist as needed    Rosetta BAUTISTAA LSW

## 2024-09-11 NOTE — ASSESSMENT & PLAN NOTE
44 y.o.  presenting as transfer from [facility] in setting of 2 day history of severe bilateral abdominal pain concerning for PID.

## 2024-09-12 ENCOUNTER — PHARMACY VISIT (OUTPATIENT)
Dept: PHARMACY | Facility: CLINIC | Age: 44
End: 2024-09-12
Payer: MEDICAID

## 2024-09-12 VITALS
SYSTOLIC BLOOD PRESSURE: 118 MMHG | HEIGHT: 62 IN | WEIGHT: 134.48 LBS | HEART RATE: 77 BPM | BODY MASS INDEX: 24.75 KG/M2 | RESPIRATION RATE: 18 BRPM | DIASTOLIC BLOOD PRESSURE: 82 MMHG | OXYGEN SATURATION: 99 % | TEMPERATURE: 97.5 F

## 2024-09-12 LAB
BACTERIA UR CULT: NO GROWTH
BASOPHILS # BLD AUTO: 0.06 X10*3/UL (ref 0–0.1)
BASOPHILS NFR BLD AUTO: 0.4 %
BB ANTIBODY IDENTIFICATION: NORMAL
CASE #: NORMAL
EOSINOPHIL # BLD AUTO: 0.15 X10*3/UL (ref 0–0.7)
EOSINOPHIL NFR BLD AUTO: 1 %
ERYTHROCYTE [DISTWIDTH] IN BLOOD BY AUTOMATED COUNT: 14.4 % (ref 11.5–14.5)
HCT VFR BLD AUTO: 33.1 % (ref 36–46)
HGB BLD-MCNC: 10.2 G/DL (ref 12–16)
IMM GRANULOCYTES # BLD AUTO: 0.12 X10*3/UL (ref 0–0.7)
IMM GRANULOCYTES NFR BLD AUTO: 0.8 % (ref 0–0.9)
LYMPHOCYTES # BLD AUTO: 1.03 X10*3/UL (ref 1.2–4.8)
LYMPHOCYTES NFR BLD AUTO: 6.7 %
MCH RBC QN AUTO: 25 PG (ref 26–34)
MCHC RBC AUTO-ENTMCNC: 30.8 G/DL (ref 32–36)
MCV RBC AUTO: 81 FL (ref 80–100)
MONOCYTES # BLD AUTO: 1.26 X10*3/UL (ref 0.1–1)
MONOCYTES NFR BLD AUTO: 8.2 %
NEUTROPHILS # BLD AUTO: 12.82 X10*3/UL (ref 1.2–7.7)
NEUTROPHILS NFR BLD AUTO: 82.9 %
NRBC BLD-RTO: 0 /100 WBCS (ref 0–0)
PATH REV-IMMUNOHEMATOLOGY-PR30: NORMAL
PLATELET # BLD AUTO: 381 X10*3/UL (ref 150–450)
RBC # BLD AUTO: 4.08 X10*6/UL (ref 4–5.2)
WBC # BLD AUTO: 15.4 X10*3/UL (ref 4.4–11.3)

## 2024-09-12 PROCEDURE — 2500000001 HC RX 250 WO HCPCS SELF ADMINISTERED DRUGS (ALT 637 FOR MEDICARE OP)

## 2024-09-12 PROCEDURE — 2500000004 HC RX 250 GENERAL PHARMACY W/ HCPCS (ALT 636 FOR OP/ED)

## 2024-09-12 PROCEDURE — 99238 HOSP IP/OBS DSCHRG MGMT 30/<: CPT | Performed by: STUDENT IN AN ORGANIZED HEALTH CARE EDUCATION/TRAINING PROGRAM

## 2024-09-12 PROCEDURE — 2500000001 HC RX 250 WO HCPCS SELF ADMINISTERED DRUGS (ALT 637 FOR MEDICARE OP): Performed by: STUDENT IN AN ORGANIZED HEALTH CARE EDUCATION/TRAINING PROGRAM

## 2024-09-12 PROCEDURE — 85025 COMPLETE CBC W/AUTO DIFF WBC: CPT | Performed by: STUDENT IN AN ORGANIZED HEALTH CARE EDUCATION/TRAINING PROGRAM

## 2024-09-12 PROCEDURE — RXMED WILLOW AMBULATORY MEDICATION CHARGE

## 2024-09-12 PROCEDURE — 36415 COLL VENOUS BLD VENIPUNCTURE: CPT | Performed by: STUDENT IN AN ORGANIZED HEALTH CARE EDUCATION/TRAINING PROGRAM

## 2024-09-12 RX ORDER — IBUPROFEN 600 MG/1
600 TABLET ORAL EVERY 6 HOURS
Qty: 90 TABLET | Refills: 1 | Status: SHIPPED | OUTPATIENT
Start: 2024-09-12

## 2024-09-12 RX ORDER — ACETAMINOPHEN 325 MG/1
975 TABLET ORAL EVERY 6 HOURS
Qty: 90 TABLET | Refills: 1 | Status: SHIPPED | OUTPATIENT
Start: 2024-09-12

## 2024-09-12 RX ORDER — DOXYCYCLINE 100 MG/1
100 CAPSULE ORAL 2 TIMES DAILY
Qty: 28 CAPSULE | Refills: 0 | Status: SHIPPED | OUTPATIENT
Start: 2024-09-12 | End: 2024-09-26

## 2024-09-12 RX ORDER — METRONIDAZOLE 500 MG/1
500 TABLET ORAL 2 TIMES DAILY
Qty: 28 TABLET | Refills: 0 | Status: SHIPPED | OUTPATIENT
Start: 2024-09-12 | End: 2024-09-26

## 2024-09-12 ASSESSMENT — PAIN SCALES - GENERAL
PAINLEVEL_OUTOF10: 4
PAINLEVEL_OUTOF10: 2
PAINLEVEL_OUTOF10: 4
PAINLEVEL_OUTOF10: 0 - NO PAIN

## 2024-09-12 ASSESSMENT — PAIN DESCRIPTION - DESCRIPTORS
DESCRIPTORS: CRAMPING
DESCRIPTORS: CRAMPING

## 2024-09-12 ASSESSMENT — PAIN DESCRIPTION - LOCATION: LOCATION: ABDOMEN

## 2024-09-12 NOTE — DISCHARGE INSTRUCTIONS
Call if you have:  Temperature greater than 100.4  Persistent nausea and vomiting  Severe uncontrolled pain  Difficulty breathing, headache or visual disturbances  Heavy vaginal bleeding  Hives  Persistent dizziness or light-headedness  Extreme fatigue  Any other questions or concerns you may have after discharge    In an emergency, call 911 or go to an Emergency Department at a nearby hospital.

## 2024-09-12 NOTE — CARE PLAN
The patient's goals for the shift include rest,pain control    The clinical goals for the shift include remain afebrile    Over the shift, the patient did make progress toward the following goals. Barriers to progression include none. Recommendations continue taking your medications every twelve hours with food to avoid gi upset.

## 2024-09-12 NOTE — DISCHARGE SUMMARY
Discharge Summary    Admission Date: 9/10/2024  Discharge Date: 9/12/2024    Discharge Diagnosis  Hydrosalpinx    Hospital Course  Patient admitted overnight on 9/10 from [facility] in setting of two day history of severe bilateral abdominal pain concerning for PID. She was febrile on 9/9 and 9/10 at 2200 but had remained afebirle for the rest of her admission. Her WBC went from 13.9 > 14.9 > 15.4 and pain improved throughout her stay with 2-3 days of IV antibiotics. Labs came back positive for gonorrhea and trichomonas and patient was informed and told partner. She will continue for 14 days of PO antibiotics that were sent meds to beds and a follow up was requested in one month with outpatient gyn. UDS was positive for methamphetamines and patient was connected with  but declined resources.      Pertinent Physical Exam At Time of Discharge  Physical Exam  Constitutional:       Comments: Anxious    Cardiovascular:      Rate and Rhythm: Normal rate.   Pulmonary:      Effort: Pulmonary effort is normal.   Abdominal:      General: Abdomen is flat. There is no distension.      Palpations: Abdomen is soft.      Tenderness: There is no guarding or rebound.      Comments: minimal Tenderness to palpation in BLQs    Musculoskeletal:         General: Normal range of motion.   Skin:     General: Skin is warm.   Neurological:      Mental Status: She is alert.   Psychiatric:      Comments: Anxious mood     Last Vitals:  Temp Pulse Resp BP MAP Pulse Ox   36.5 °C (97.7 °F) 96 18 123/64 97 99 %     Discharge Meds     Your medication list        START taking these medications        Instructions Last Dose Given Next Dose Due   doxycycline 100 mg capsule  Commonly known as: Vibramycin      Take 1 capsule (100 mg) by mouth 2 times a day for 14 days. Take with at least 8 ounces (large glass) of water, do not lie down for 30 minutes after       metroNIDAZOLE 500 mg tablet  Commonly known as: Flagyl      Take 1 tablet (500 mg) by mouth 2  times a day for 14 days.              CHANGE how you take these medications        Instructions Last Dose Given Next Dose Due   acetaminophen 500 mg tablet  Commonly known as: Tylenol  What changed: Another medication with the same name was added. Make sure you understand how and when to take each.           acetaminophen 325 mg tablet  Commonly known as: Tylenol  What changed: You were already taking a medication with the same name, and this prescription was added. Make sure you understand how and when to take each.      Take 3 tablets (975 mg) by mouth every 6 hours.        mg tablet  Generic drug: ibuprofen  What changed: Another medication with the same name was added. Make sure you understand how and when to take each.           ibuprofen 600 mg tablet  What changed: You were already taking a medication with the same name, and this prescription was added. Make sure you understand how and when to take each.      Take 1 tablet (600 mg) by mouth every 6 hours.                 Where to Get Your Medications        These medications were sent to [facility]  [address] [place], [place] [id number]      Hours: 8:30 AM to 5 PM Mon-Fri Phone: [phone]   acetaminophen 325 mg tablet  doxycycline 100 mg capsule  ibuprofen 600 mg tablet  metroNIDAZOLE 500 mg tablet          Test Results Pending At Discharge  Pending Labs       Order Current Status    BB ORDER ONLY - Antibody Identification In process    Path Review-Immunohematology In process            Outpatient Follow-Up  No future appointments.        Porsha Christianson MD

## 2024-10-08 ENCOUNTER — APPOINTMENT (OUTPATIENT)
Dept: OBSTETRICS AND GYNECOLOGY | Facility: CLINIC | Age: 44
End: 2024-10-08
Payer: MEDICAID

## 2024-10-08 VITALS
WEIGHT: 141.6 LBS | HEIGHT: 62 IN | BODY MASS INDEX: 26.06 KG/M2 | DIASTOLIC BLOOD PRESSURE: 90 MMHG | SYSTOLIC BLOOD PRESSURE: 138 MMHG

## 2024-10-08 DIAGNOSIS — N73.9 PELVIC INFLAMMATORY DISEASE: ICD-10-CM

## 2024-10-08 DIAGNOSIS — N70.11 HYDROSALPINX: Primary | ICD-10-CM

## 2024-10-08 DIAGNOSIS — R10.2 PELVIC PAIN IN FEMALE: ICD-10-CM

## 2024-10-08 DIAGNOSIS — Z86.19 HISTORY OF TRICHOMONIASIS: ICD-10-CM

## 2024-10-08 DIAGNOSIS — Z86.19 HISTORY OF GONORRHEA: ICD-10-CM

## 2024-10-08 DIAGNOSIS — Z12.4 SCREENING FOR CERVICAL CANCER: ICD-10-CM

## 2024-10-08 PROCEDURE — 99459 PELVIC EXAMINATION: CPT | Performed by: OBSTETRICS & GYNECOLOGY

## 2024-10-08 PROCEDURE — 88175 CYTOPATH C/V AUTO FLUID REDO: CPT

## 2024-10-08 PROCEDURE — 87591 N.GONORRHOEAE DNA AMP PROB: CPT

## 2024-10-08 PROCEDURE — 87661 TRICHOMONAS VAGINALIS AMPLIF: CPT

## 2024-10-08 PROCEDURE — 99214 OFFICE O/P EST MOD 30 MIN: CPT | Performed by: OBSTETRICS & GYNECOLOGY

## 2024-10-08 PROCEDURE — 87624 HPV HI-RISK TYP POOLED RSLT: CPT

## 2024-10-08 PROCEDURE — 3008F BODY MASS INDEX DOCD: CPT | Performed by: OBSTETRICS & GYNECOLOGY

## 2024-10-08 PROCEDURE — 87491 CHLMYD TRACH DNA AMP PROBE: CPT

## 2024-10-08 ASSESSMENT — PAIN SCALES - GENERAL: PAINLEVEL: 2

## 2024-10-08 NOTE — PROGRESS NOTES
"SUBJECTIVE    44 y.o.  Having periods female presents for   Chief Complaint   Patient presents with    Follow-up     Here to follow up from ER visit 9/10/24    New Patient Visit      Pt presents for hospital follow up. She was transferred from [facility] to [facility] on  with fever, abdominal pain, and was found to have bilateral hydrosalpinges c/w/PID. Her pain improved with IV antibiotics.  Labs were notable for gonorrhea and trichomonas.  She was discharged home with 14d of Doxycycline and Flagyl.    Today the patient reports that she took 11d of the antibiotics as a few doses were wasted.  Her abdominal pain is improved.  She reports that she has intermittent cramping. She is unsure if this is diet related or not. She cannot describe if the pain is \"gone\" or not.    Of note, the pt was admitted 2022 and had bilateral TOAs that were treated with IR drainage and IV antibiotics.    Pt is unsure when her last PAP was.    OB/GYN History  Patient's last menstrual period was 2024 (approximate).    Social History     Substance and Sexual Activity   Sexual Activity Yes    Partners: Male    Birth control/protection: None       OB History    Para Term  AB Living   2         2   SAB IAB Ectopic Multiple Live Births                  # Outcome Date GA Lbr Tr/2nd Weight Sex Type Anes PTL Lv   2             1                 Past Medical History  She has no past medical history on file.    Surgical History  She has a past surgical history that includes US guided transvaginal transrectal fluid drain (12/15/2022) and US guided aspiration of abscess, hematoma, cyst (12/15/2022).     Social History  She reports that she has been smoking cigarettes. She started smoking about 26 years ago. She has a 13.4 pack-year smoking history. She has never used smokeless tobacco. She reports current drug use. Drug: Methamphetamines. No history on file for alcohol use.      Screenings  Social Determinants of " "Health     Tobacco Use: High Risk (10/8/2024)    Patient History     Smoking Tobacco Use: Every Day     Smokeless Tobacco Use: Never     Passive Exposure: Not on file   Alcohol Use: Not At Risk (9/10/2024)    AUDIT-C     Frequency of Alcohol Consumption: Never     Average Number of Drinks: Patient does not drink     Frequency of Binge Drinking: Never   Financial Resource Strain: Low Risk  (9/10/2024)    Overall Financial Resource Strain (CARDIA)     Difficulty of Paying Living Expenses: Not hard at all   Food Insecurity: Not on file   Transportation Needs: No Transportation Needs (9/10/2024)    PRAPARE - Transportation     Lack of Transportation (Medical): No     Lack of Transportation (Non-Medical): No   Physical Activity: Not on file   Stress: Not on file   Social Connections: Not on file   Intimate Partner Violence: Not on file   Depression: Not at risk (9/10/2024)    PHQ-2     PHQ-2 Score: 0   Housing Stability: Low Risk  (9/10/2024)    Housing Stability Vital Sign     Unable to Pay for Housing in the Last Year: No     Number of Times Moved in the Last Year: 1     Homeless in the Last Year: No   Utilities: Not on file   Digital Equity: Not on file   Health Literacy: Not on file         OBJECTIVE  Vitals:    10/08/24 0928   BP: 138/90   Weight: 64.2 kg (141 lb 9.6 oz)   Height: 1.575 m (5' 2\")     Body mass index is 25.9 kg/m².     Chaperone: Present: yes  Physical Exam  Constitutional:       Appearance: Normal appearance.   Genitourinary:      No lesions in the vagina.      Right Labia: No rash, tenderness or lesions.     Left Labia: No tenderness, lesions or rash.     No vaginal discharge, erythema, bleeding or ulceration.      No cervical motion tenderness, discharge, friability or lesion.      Uterus is not fixed or tender.   Abdominal:      General: Abdomen is flat. There is no distension.      Tenderness: There is no abdominal tenderness.   Neurological:      Mental Status: She is alert.        ASSESSMENT & " PLAN  Problem List Items Addressed This Visit       History of gonorrhea    Relevant Orders    C. trachomatis / N. gonorrhoeae, Amplified    Trichomonas vaginalis, Amplified    Hydrosalpinx - Primary    Relevant Orders    C. trachomatis / N. gonorrhoeae, Amplified    Trichomonas vaginalis, Amplified    US PELVIS TRANSABDOMINAL WITH TRANSVAGINAL    Pelvic inflammatory disease    Relevant Orders    C. trachomatis / N. gonorrhoeae, Amplified    Trichomonas vaginalis, Amplified    C. trachomatis / N. gonorrhoeae, Amplified    Trichomonas vaginalis, Amplified    US PELVIS TRANSABDOMINAL WITH TRANSVAGINAL     Other Visit Diagnoses       History of trichomoniasis        Relevant Orders    C. trachomatis / N. gonorrhoeae, Amplified    Trichomonas vaginalis, Amplified    C. trachomatis / N. gonorrhoeae, Amplified    Trichomonas vaginalis, Amplified    Pelvic pain in female        Screening for cervical cancer        Relevant Orders    THINPREP PAP TEST            Follow up: Will rescreen for STIs. Reviewed importance of partner treatment and abstaining from sexual activity until her partner his treated.  PAP updated.  Will re-evaluate imaging with repeat ultrasound.    Andrea Kenney MD  Obstetrics & Gynecology  10/08/24

## 2024-10-09 LAB
C TRACH RRNA SPEC QL NAA+PROBE: NEGATIVE
N GONORRHOEA DNA SPEC QL PROBE+SIG AMP: POSITIVE
T VAGINALIS RRNA SPEC QL NAA+PROBE: NEGATIVE

## 2024-10-10 ENCOUNTER — TELEPHONE (OUTPATIENT)
Dept: OBSTETRICS AND GYNECOLOGY | Facility: CLINIC | Age: 44
End: 2024-10-10
Payer: MEDICAID

## 2024-10-10 NOTE — TELEPHONE ENCOUNTER
Called patient to discuss follow up needed  Left  for patient to return call.    YOAN Milligan RN    ----- Message from Andrea Kenney sent at 10/9/2024  2:50 PM EDT -----  Please call the patient regarding her abnormal result. Pt needs 500mg Ceftriaxine IM ASAP for unresolved Gonorrhea. I talked to her yesterday about partner treatment. Please let me know when she is scheduled (RN only visit OK for the injection). Thanks!

## 2024-10-10 NOTE — TELEPHONE ENCOUNTER
Patient returning call  Identified by name and   Informed patient of need for additional injection for unresolved STI  Patient will call back later to get scheduled but would like to scheduled for tomorrow.  Patient verbalized understanding, all questions/concerns addressed at this time.    YOAN Milligan RN    ----- Message from Andrea Kenney sent at 10/9/2024  2:50 PM EDT -----  Please call the patient regarding her abnormal result. Pt needs 500mg Ceftriaxine IM ASAP for unresolved Gonorrhea. I talked to her yesterday about partner treatment. Please let me know when she is scheduled (RN only visit OK for the injection). Thanks!

## 2024-10-11 ENCOUNTER — TELEPHONE (OUTPATIENT)
Dept: OBSTETRICS AND GYNECOLOGY | Facility: CLINIC | Age: 44
End: 2024-10-11
Payer: MEDICAID

## 2024-10-15 ENCOUNTER — HOSPITAL ENCOUNTER (OUTPATIENT)
Dept: RADIOLOGY | Facility: HOSPITAL | Age: 44
Discharge: HOME | End: 2024-10-15
Payer: MEDICAID

## 2024-10-15 DIAGNOSIS — N73.9 PELVIC INFLAMMATORY DISEASE: ICD-10-CM

## 2024-10-15 DIAGNOSIS — N70.11 HYDROSALPINX: ICD-10-CM

## 2024-10-15 PROCEDURE — 76856 US EXAM PELVIC COMPLETE: CPT

## 2024-10-15 NOTE — TELEPHONE ENCOUNTER
Called patient to discuss injection appointment  Left vm for patient to return call.    Lea Perera, LOVEN RN

## 2024-10-15 NOTE — TELEPHONE ENCOUNTER
Returned patient's call  Identified by name and   Scheduled patient for  at 10:00 am for nurse visit at [address]  Patient verbalized understanding, all questions/concerns addressed at this time.    YOAN Milligan RN      ----- Message from Andrea Kennye sent at 10/9/2024  2:50 PM EDT -----  Please call the patient regarding her abnormal result. Pt needs 500mg Ceftriaxine IM ASAP for unresolved Gonorrhea. I talked to her yesterday about partner treatment. Please let me know when she is scheduled (RN only visit OK for the injection). Thanks!

## 2024-10-16 ENCOUNTER — OFFICE VISIT (OUTPATIENT)
Dept: OBSTETRICS AND GYNECOLOGY | Facility: CLINIC | Age: 44
End: 2024-10-16
Payer: MEDICAID

## 2024-10-16 DIAGNOSIS — A54.9 GONORRHEA: Primary | ICD-10-CM

## 2024-10-16 RX ORDER — CEFTRIAXONE 500 MG/1
500 INJECTION, POWDER, FOR SOLUTION INTRAMUSCULAR; INTRAVENOUS ONCE
Qty: 1.43 ML | Refills: 0 | Status: SHIPPED | OUTPATIENT
Start: 2024-10-16 | End: 2024-10-16

## 2024-10-16 NOTE — PROGRESS NOTES
Rocephin Injection  (500mg) given right gluteal area.  NDC 17410-110-41.  Lot [id number].  Exp 5/31/2025

## 2025-04-03 ENCOUNTER — APPOINTMENT (OUTPATIENT)
Dept: PRIMARY CARE | Facility: CLINIC | Age: 45
End: 2025-04-03
Payer: MEDICAID